# Patient Record
Sex: MALE | Race: WHITE | NOT HISPANIC OR LATINO | Employment: OTHER | URBAN - METROPOLITAN AREA
[De-identification: names, ages, dates, MRNs, and addresses within clinical notes are randomized per-mention and may not be internally consistent; named-entity substitution may affect disease eponyms.]

---
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
## 2021-11-18 ENCOUNTER — TRANSFERRED RECORDS (OUTPATIENT)
Dept: HEALTH INFORMATION MANAGEMENT | Facility: CLINIC | Age: 21
End: 2021-11-18

## 2021-11-19 ENCOUNTER — TRANSFERRED RECORDS (OUTPATIENT)
Dept: HEALTH INFORMATION MANAGEMENT | Facility: CLINIC | Age: 21
End: 2021-11-19
Payer: COMMERCIAL

## 2021-11-21 DIAGNOSIS — N21.0 BLADDER STONE: Primary | ICD-10-CM

## 2021-11-21 RX ORDER — AMPICILLIN 2 G/1
2 INJECTION, POWDER, FOR SOLUTION INTRAVENOUS
Status: CANCELLED | OUTPATIENT
Start: 2021-11-21

## 2021-11-24 ENCOUNTER — MEDICAL CORRESPONDENCE (OUTPATIENT)
Dept: HEALTH INFORMATION MANAGEMENT | Facility: CLINIC | Age: 21
End: 2021-11-24
Payer: COMMERCIAL

## 2021-12-03 ENCOUNTER — TELEPHONE (OUTPATIENT)
Dept: UROLOGY | Facility: CLINIC | Age: 21
End: 2021-12-03

## 2021-12-03 ENCOUNTER — HOSPITAL ENCOUNTER (OUTPATIENT)
Facility: AMBULATORY SURGERY CENTER | Age: 21
End: 2021-12-03
Attending: UROLOGY
Payer: MEDICAID

## 2021-12-03 DIAGNOSIS — N21.0 BLADDER STONE: ICD-10-CM

## 2021-12-03 NOTE — CONFIDENTIAL NOTE
Patient is scheduled for surgery with Dr. Max    Spoke with: Patient's mother Ayala    Date of Surgery: Monday 01/03/22     Location: [facility] OR     Informed patient they will need an adult  Yes    Pre op with Provider vijay    H&P: Scheduled with Patient will schedule with PCP    Pre-procedure COVID-19 Test: Patient will schedule closer to home. Instructed mother with COVID testing requirements for [facility].     Additional imaging/appointments: na    Surgery packet: mailed to patient 12/03/21, verified address on file is current and correct.      Additional comments: patient has Cerebral Palsy and is non verbal. Uses wheelchair. Requires transfer lift. Per mom patient does not have a cardiac or pulmonary history. Is not on dialysis and has not had any previous issues with anesthesia. Weighs 135 pounds.

## 2021-12-09 DIAGNOSIS — Z11.59 ENCOUNTER FOR SCREENING FOR OTHER VIRAL DISEASES: ICD-10-CM

## 2021-12-29 ENCOUNTER — PATIENT OUTREACH (OUTPATIENT)
Dept: UROLOGY | Facility: CLINIC | Age: 21
End: 2021-12-29
Payer: COMMERCIAL

## 2021-12-29 DIAGNOSIS — N21.0 BLADDER STONE: ICD-10-CM

## 2021-12-29 DIAGNOSIS — Z01.812 PRE-PROCEDURE LAB EXAM: Primary | ICD-10-CM

## 2021-12-29 NOTE — TELEPHONE ENCOUNTER
Informed mother of surgery date she was relieved however was at MD appointment and could not take information.  Mother to call writer in am for further information.  Stephanie Castillo RN

## 2021-12-30 ENCOUNTER — TELEPHONE (OUTPATIENT)
Dept: UROLOGY | Facility: CLINIC | Age: 21
End: 2021-12-30
Payer: COMMERCIAL

## 2021-12-30 NOTE — CONFIDENTIAL NOTE
Spoke to patients mother about surgery scheduled with Dr. Max date and location change. Patient's mother is aware that surgery has been moved from 01/03/22 at [facility] to Thursday 01/06/22 at [place] OR. Verified location address. She is also aware that patient needs to have a covid test on Monday 01/03/22 in accordance to [facility] Covid testing guidelines.

## 2022-01-03 RX ORDER — SULFAMETHOXAZOLE AND TRIMETHOPRIM 200; 40 MG/5ML; MG/5ML
20 SUSPENSION ORAL 2 TIMES DAILY
Qty: 120 ML | Refills: 0 | Status: SHIPPED | OUTPATIENT
Start: 2022-01-03 | End: 2022-01-06

## 2022-01-03 NOTE — TELEPHONE ENCOUNTER
Pre Op Teaching Flowsheet                                        Pre and Post op Patient Education  Relevant Diagnosis: kidney stone  Teaching Topic:  Pre and post op teaching for cystoscopy  Person Involved in teaching: mother        Motivation Level: High  Asks Questions: Yes  Eager to Learn:  Yes  Cooperative: Yes  Receptive (willing/able to accept information):  Yes  Patient demonstrates understanding of the following:  Date and time of surgery:    Location of surgery:   History and Physical and any other testing necessary prior to surgery Pre Op Physical with: PCP on completed  Required time line for completion of History and Physical and any pre-op testing: No more than 30 days prior to surgery date    NPO Guidelines: NPO per Anesthesia Guidelines : Reviewed (surgery packet for further information).    Patient demonstrates understanding of the following:  Patient understands the need for a responsible adult to drive them home and someone to stay with them for the first 24 hours post-operatively: mother  Pre-op bowel prep: N/A  Pre-op showering/scrub information with Hibiclens Soap: Yes  Medications to take the day of surgery: Pre op Physical for instruction.   Blood thinner medications discussed and when to stop (if applicable):  Yes  Diabetes medication management (if applicable):  Patient to discuss with Primary Care Provider  Discussed pain control after surgery: pain scale, pain medications and pain management techniques  Infection Prevention: Patient demonstrates understanding of the following:  Patient instructed on hand hygiene:  Yes  Surgical procedure site care taught: Yes  Signs and symptoms of infection taught:  Yes  Wound care will be taught at the time of discharge.    Urine anylisis and Urine Culture ordered on: 12/21/21  Pre op Covid testing on: 1/03/22  Post-op follow-up:As needed  Discussed how to contact the hospital, nurse, and clinic scheduling staff if necessary.     Surgical instructions  given to patient in clinic: via phone.   Instructional materials used/given/mailed: Before your surgery packet , Medications to avoid before surgery , Showering or Bathing instructions before surgery  and What to expect after surgery    Total time with patient: 10 minutes   Stephanie Castillo RN

## 2022-01-04 RX ORDER — DIAZEPAM 20 MG/4G
20 GEL RECTAL EVERY 10 MIN PRN
COMMUNITY

## 2022-01-04 RX ORDER — CLONAZEPAM 1 MG/1
1 TABLET ORAL
COMMUNITY

## 2022-01-04 RX ORDER — LAMOTRIGINE 200 MG/1
300 TABLET ORAL 2 TIMES DAILY
COMMUNITY

## 2022-01-04 RX ORDER — TRAZODONE HYDROCHLORIDE 50 MG/1
100 TABLET, FILM COATED ORAL AT BEDTIME
COMMUNITY

## 2022-01-04 RX ORDER — PANTOPRAZOLE SODIUM 20 MG/1
20 TABLET, DELAYED RELEASE ORAL DAILY
COMMUNITY

## 2022-01-04 RX ORDER — GLYCOPYRROLATE 1 MG/5ML
1 SOLUTION ORAL 4 TIMES DAILY
COMMUNITY

## 2022-01-04 RX ORDER — GLYCOPYRROLATE 0.2 MG/ML
0.1 INJECTION INTRAMUSCULAR; INTRAVENOUS ONCE
COMMUNITY
End: 2022-01-04

## 2022-01-04 RX ORDER — NYSTATIN 100000 U/G
OINTMENT TOPICAL 2 TIMES DAILY PRN
COMMUNITY

## 2022-01-04 RX ORDER — LACOSAMIDE 150 MG/1
TABLET ORAL 2 TIMES DAILY
COMMUNITY

## 2022-01-04 RX ORDER — LACTOBACILLUS RHAMNOSUS GG 10B CELL
2 CAPSULE ORAL 2 TIMES DAILY
COMMUNITY

## 2022-01-04 RX ORDER — PREGABALIN 200 MG/1
200 CAPSULE ORAL 3 TIMES DAILY
COMMUNITY

## 2022-01-04 RX ORDER — LANOLIN ALCOHOL/MO/W.PET/CERES
9 CREAM (GRAM) TOPICAL
COMMUNITY

## 2022-01-05 ENCOUNTER — ANESTHESIA EVENT (OUTPATIENT)
Dept: SURGERY | Facility: CLINIC | Age: 22
End: 2022-01-05
Payer: COMMERCIAL

## 2022-01-06 ENCOUNTER — HOSPITAL ENCOUNTER (OUTPATIENT)
Facility: CLINIC | Age: 22
Discharge: HOME | End: 2022-01-06
Attending: UROLOGY | Admitting: UROLOGY
Payer: COMMERCIAL

## 2022-01-06 ENCOUNTER — ANESTHESIA (OUTPATIENT)
Dept: SURGERY | Facility: CLINIC | Age: 22
End: 2022-01-06
Payer: COMMERCIAL

## 2022-01-06 VITALS
RESPIRATION RATE: 14 BRPM | HEART RATE: 87 BPM | DIASTOLIC BLOOD PRESSURE: 69 MMHG | OXYGEN SATURATION: 99 % | HEIGHT: 66 IN | SYSTOLIC BLOOD PRESSURE: 115 MMHG | TEMPERATURE: 98 F | BODY MASS INDEX: 21.65 KG/M2 | WEIGHT: 134.7 LBS

## 2022-01-06 LAB — GLUCOSE BLDC GLUCOMTR-MCNC: 87 MG/DL (ref 70–99)

## 2022-01-06 PROCEDURE — 250N000011 HC RX IP 250 OP 636: Performed by: UROLOGY

## 2022-01-06 PROCEDURE — 272N000001 HC OR GENERAL SUPPLY STERILE: Performed by: UROLOGY

## 2022-01-06 PROCEDURE — 250N000011 HC RX IP 250 OP 636: Performed by: NURSE ANESTHETIST, CERTIFIED REGISTERED

## 2022-01-06 PROCEDURE — 82962 GLUCOSE BLOOD TEST: CPT

## 2022-01-06 PROCEDURE — C1894 INTRO/SHEATH, NON-LASER: HCPCS | Performed by: UROLOGY

## 2022-01-06 PROCEDURE — 250N000025 HC SEVOFLURANE, PER MIN: Performed by: UROLOGY

## 2022-01-06 PROCEDURE — 258N000003 HC RX IP 258 OP 636: Performed by: UROLOGY

## 2022-01-06 PROCEDURE — 710N000011 HC RECOVERY PHASE 1, LEVEL 3, PER MIN: Performed by: UROLOGY

## 2022-01-06 PROCEDURE — 250N000009 HC RX 250: Performed by: NURSE ANESTHETIST, CERTIFIED REGISTERED

## 2022-01-06 PROCEDURE — 370N000017 HC ANESTHESIA TECHNICAL FEE, PER MIN: Performed by: UROLOGY

## 2022-01-06 PROCEDURE — 999N000141 HC STATISTIC PRE-PROCEDURE NURSING ASSESSMENT: Performed by: UROLOGY

## 2022-01-06 PROCEDURE — 258N000003 HC RX IP 258 OP 636: Performed by: NURSE ANESTHETIST, CERTIFIED REGISTERED

## 2022-01-06 PROCEDURE — 360N000075 HC SURGERY LEVEL 2, PER MIN: Performed by: UROLOGY

## 2022-01-06 PROCEDURE — 710N000012 HC RECOVERY PHASE 2, PER MINUTE: Performed by: UROLOGY

## 2022-01-06 PROCEDURE — 51705 CHANGE OF BLADDER TUBE: CPT | Mod: GC | Performed by: STUDENT IN AN ORGANIZED HEALTH CARE EDUCATION/TRAINING PROGRAM

## 2022-01-06 RX ORDER — FENTANYL CITRATE 50 UG/ML
25 INJECTION, SOLUTION INTRAMUSCULAR; INTRAVENOUS EVERY 5 MIN PRN
Status: DISCONTINUED | OUTPATIENT
Start: 2022-01-06 | End: 2022-01-06 | Stop reason: HOSPADM

## 2022-01-06 RX ORDER — LABETALOL HYDROCHLORIDE 5 MG/ML
10 INJECTION, SOLUTION INTRAVENOUS
Status: DISCONTINUED | OUTPATIENT
Start: 2022-01-06 | End: 2022-01-06 | Stop reason: HOSPADM

## 2022-01-06 RX ORDER — SODIUM CHLORIDE, SODIUM LACTATE, POTASSIUM CHLORIDE, CALCIUM CHLORIDE 600; 310; 30; 20 MG/100ML; MG/100ML; MG/100ML; MG/100ML
INJECTION, SOLUTION INTRAVENOUS CONTINUOUS PRN
Status: DISCONTINUED | OUTPATIENT
Start: 2022-01-06 | End: 2022-01-06

## 2022-01-06 RX ORDER — LIDOCAINE 40 MG/G
CREAM TOPICAL
Status: DISCONTINUED | OUTPATIENT
Start: 2022-01-06 | End: 2022-01-06 | Stop reason: HOSPADM

## 2022-01-06 RX ORDER — ONDANSETRON 2 MG/ML
INJECTION INTRAMUSCULAR; INTRAVENOUS PRN
Status: DISCONTINUED | OUTPATIENT
Start: 2022-01-06 | End: 2022-01-06

## 2022-01-06 RX ORDER — LIDOCAINE HYDROCHLORIDE 20 MG/ML
INJECTION, SOLUTION INFILTRATION; PERINEURAL PRN
Status: DISCONTINUED | OUTPATIENT
Start: 2022-01-06 | End: 2022-01-06

## 2022-01-06 RX ORDER — PROPOFOL 10 MG/ML
INJECTION, EMULSION INTRAVENOUS PRN
Status: DISCONTINUED | OUTPATIENT
Start: 2022-01-06 | End: 2022-01-06

## 2022-01-06 RX ORDER — SODIUM CHLORIDE, SODIUM LACTATE, POTASSIUM CHLORIDE, CALCIUM CHLORIDE 600; 310; 30; 20 MG/100ML; MG/100ML; MG/100ML; MG/100ML
INJECTION, SOLUTION INTRAVENOUS CONTINUOUS
Status: DISCONTINUED | OUTPATIENT
Start: 2022-01-06 | End: 2022-01-06 | Stop reason: HOSPADM

## 2022-01-06 RX ORDER — ONDANSETRON 2 MG/ML
4 INJECTION INTRAMUSCULAR; INTRAVENOUS EVERY 30 MIN PRN
Status: DISCONTINUED | OUTPATIENT
Start: 2022-01-06 | End: 2022-01-06 | Stop reason: HOSPADM

## 2022-01-06 RX ORDER — ONDANSETRON 4 MG/1
4 TABLET, ORALLY DISINTEGRATING ORAL EVERY 30 MIN PRN
Status: DISCONTINUED | OUTPATIENT
Start: 2022-01-06 | End: 2022-01-06 | Stop reason: HOSPADM

## 2022-01-06 RX ORDER — AMPICILLIN 2 G/1
2 INJECTION, POWDER, FOR SOLUTION INTRAVENOUS
Status: COMPLETED | OUTPATIENT
Start: 2022-01-06 | End: 2022-01-06

## 2022-01-06 RX ADMIN — GENTAMICIN SULFATE 180 MG: 40 INJECTION, SOLUTION INTRAMUSCULAR; INTRAVENOUS at 08:41

## 2022-01-06 RX ADMIN — SUGAMMADEX 150 MG: 100 INJECTION, SOLUTION INTRAVENOUS at 09:55

## 2022-01-06 RX ADMIN — MIDAZOLAM 1 MG: 1 INJECTION INTRAMUSCULAR; INTRAVENOUS at 09:25

## 2022-01-06 RX ADMIN — ONDANSETRON 4 MG: 2 INJECTION INTRAMUSCULAR; INTRAVENOUS at 09:56

## 2022-01-06 RX ADMIN — PROPOFOL 50 MG: 10 INJECTION, EMULSION INTRAVENOUS at 09:31

## 2022-01-06 RX ADMIN — SODIUM CHLORIDE, POTASSIUM CHLORIDE, SODIUM LACTATE AND CALCIUM CHLORIDE: 600; 310; 30; 20 INJECTION, SOLUTION INTRAVENOUS at 09:19

## 2022-01-06 RX ADMIN — AMPICILLIN SODIUM 2 G: 2 INJECTION, POWDER, FOR SOLUTION INTRAMUSCULAR; INTRAVENOUS at 09:40

## 2022-01-06 RX ADMIN — LIDOCAINE HYDROCHLORIDE 60 MG: 20 INJECTION, SOLUTION INFILTRATION; PERINEURAL at 09:32

## 2022-01-06 RX ADMIN — ROCURONIUM BROMIDE 30 MG: 50 INJECTION, SOLUTION INTRAVENOUS at 09:32

## 2022-01-06 ASSESSMENT — MIFFLIN-ST. JEOR: SCORE: 1558.75

## 2022-01-06 ASSESSMENT — ENCOUNTER SYMPTOMS: SEIZURES: 1

## 2022-01-06 NOTE — DISCHARGE INSTRUCTIONS
[facility] [place] // Same-Day Surgery // Adult Discharge Orders & Instructions     Take it easy when you get home.  Remember, same day surgery DOES NOT MEAN SAME DAY RECOVERY! Healing is a gradual process.   You will need some time to recover- you may be more tired than you realize at first.  Rest and relax for at least the first 24 hours at home.  You'll feel better and heal faster if you take good care of yourself.     ANESTHESIA RECOVERY -   For 24 hours after surgery    1. Get plenty of rest.  A responsible adult must stay with you for at least 24 hours after you leave the hospital.   2. Do not drive or use heavy equipment.  If you have weakness or tingling, don't drive or use heavy equipment until this feeling goes away.  3. Do not drink alcohol.  4. Avoid strenuous or risky activities.  Ask for help when climbing stairs.   5. You may feel lightheaded.  IF so, sit for a few minutes before standing.  Have someone help you get up.   6. If you have nausea (feel sick to your stomach): Drink only clear liquids such as apple juice, ginger ale, broth or 7-Up.  Rest may also help.  Be sure to drink enough fluids.  Move to a regular diet as you feel able.  7. You may have a slight fever. Call the doctor if your fever is over 100 F (37.7 C) (taken under the tongue) or lasts longer than 24 hours.  8. You may have a dry mouth, a sore throat, muscle aches or trouble sleeping.  These should go away after 24 hours.  9. Do not make important or legal decisions.     Call your doctor for any of the followin.  Signs of infection (fever, growing tenderness at the surgery site, a large amount of drainage or bleeding, severe pain, foul-smelling drainage, redness, swelling).  2. It has been over 8 to 10 hours since surgery and you are still not able to urinate (pass water).  3.  Headache for over 24 hours.    To contact a doctor, call:    Dr Max's clinic at [phone] and  ask for the resident on call for Urology (answered 24 hours a day)    Emergency Department: [facility]: [phone] (TTY for hearing impaired: [phone])

## 2022-01-06 NOTE — OR NURSING
Discharge instructions provided and reviewed with Ayala (mother, caregiver), designated caregiver. Printed after visit summary sent home with patient. Understanding verbalized.  No new scripts today. Yip capped at discharge which is Ayala's preference over yip bag connected, this plan was communicated w Dr Winter michael.

## 2022-01-06 NOTE — ANESTHESIA POSTPROCEDURE EVALUATION
Patient: Erasto Stinson    Procedure: Procedure(s):  CYSTOSCOPY, with laser standby       Diagnosis:Bladder stone [N21.0]  Diagnosis Additional Information: No value filed.    Anesthesia Type:  General    Note:  Disposition: Outpatient   Postop Pain Control: Uneventful            Sign Out: Well controlled pain   PONV: No   Neuro/Psych: Uneventful            Sign Out: Acceptable/Baseline neuro status   Airway/Respiratory: Uneventful            Sign Out: Acceptable/Baseline resp. status   CV/Hemodynamics: Uneventful            Sign Out: Acceptable CV status; No obvious hypovolemia; No obvious fluid overload   Other NRE: NONE   DID A NON-ROUTINE EVENT OCCUR? No           Last vitals:  Vitals Value Taken Time   /67 01/06/22 1045   Temp 36.7  C (98  F) 01/06/22 1045   Pulse 82 01/06/22 1053   Resp 12 01/06/22 1045   SpO2 99 % 01/06/22 1053   Vitals shown include unvalidated device data.    Electronically Signed By: Dejan Tovar DO  January 6, 2022  11:00 AM

## 2022-01-06 NOTE — ANESTHESIA CARE TRANSFER NOTE
Patient: Erasto Stinson    Procedure: Procedure(s):  CYSTOSCOPY, with laser standby       Diagnosis: Bladder stone [N21.0]  Diagnosis Additional Information: No value filed.    Anesthesia Type:   General     Note:    Oropharynx: oropharynx clear of all foreign objects  Level of Consciousness: awake  Oxygen Supplementation: face mask  Level of Supplemental Oxygen (L/min / FiO2): 8  Independent Airway: airway patency satisfactory and stable  Dentition: dentition unchanged  Vital Signs Stable: post-procedure vital signs reviewed and stable  Report to RN Given: handoff report given  Patient transferred to: PACU    Handoff Report: Identifed the Patient, Identified the Reponsible Provider, Reviewed the pertinent medical history, Discussed the surgical course, Reviewed Intra-OP anesthesia mangement and issues during anesthesia, Set expectations for post-procedure period and Allowed opportunity for questions and acknowledgement of understanding      Vitals:  Vitals Value Taken Time   /85 01/06/22 1014   Temp 36.5 01/06/22 1020   Pulse 80 01/06/22 1020   Resp 12 01/06/22 1020   SpO2 100 % 01/06/22 1020   Vitals shown include unvalidated device data.    Electronically Signed By: EDWINA Chacko CRNA  January 6, 2022  10:20 AM

## 2022-01-06 NOTE — OP NOTE
OPERATIVE REPORT  1/6/21    PRE-OPERATIVE DIAGNOSIS:   1. Bladder stone    POST-OPERATIVE DIAGNOSIS:   1.  Negative cystoscopy    PROCEDURES PERFORMED:   1. Cystourethroscopy  2. Exchange of SPT    STAFF SURGEON: Logan Max MD  RESIDENT SURGEON:  Angelika Bonilla MD    ANESTHESIA: GETA  ESTIMATED BLOOD LOSS: 0 mL.   SPECIMEN: none  DRAINS/TUBES: 20Fr SPT    SIGNIFICANT FINDINGS:  1. No bladder stone. Ultrasound likely consistent with calcified debris, which was previously irrigated.    OPERATIVE INDICATIONS:   Erasto Stinson is a(n) 21 year old male with a history of CP and recurrent UTIs found to have ultrasound consistent with a 1.8cm bladder stone at [place]. The patient was counseled on the alternatives, risks, and benefits and elected to proceed with the above stated procedure.    DESCRIPTION OF PROCEDURE:   After verification of informed consent was obtained, the patient was brought to the operating room, and moved to the operating table. After adequate anesthesia was induced, the patient was repositioned in the lithotomy position and prepped and draped in the usual sterile fashion. A formal timeout was performed to confirm the correct patient, procedure and operative site.     A 22Fr rigid cystoscope was inserted into a well lubricated urethra. The urethra was unremarkable. Media was clear. Inside the bladder there was minimal debris and no clear stone. The cystoscope was withdrawn. The sheath was exchanged for 19Fr and the cystoscope was inserted into the patient's SP tract. Again, there was no stone visualized. The bladder neck was closed. The cystoscope was removed and a 20fr catheter was placed into the SP tract. This concluded our procedure.     The procedure was then concluded. The patient was transferred to the postanesthesia care unit in stable condition and tolerated the procedure well.    As attending surgeon, I, Logan Max MD, was present for the entire procedure.

## 2022-01-06 NOTE — ANESTHESIA PROCEDURE NOTES
Airway       Patient location during procedure: OR       Procedure Start/Stop Times: 1/6/2022 9:35 AM  Staff -        Anesthesiologist:  Niki Ratliff MD       Other Anesthesia Staff: Kristina Langford       Performed By: SRNA  Consent for Airway        Urgency: elective  Indications and Patient Condition       Indications for airway management: lavelle-procedural       Induction type:intravenous       Mask difficulty assessment: 1 - vent by mask    Final Airway Details       Final airway type: endotracheal airway       Successful airway: ETT - single  Endotracheal Airway Details        ETT size (mm): 7.0       Cuffed: yes       Successful intubation technique: direct laryngoscopy and video laryngoscopy       VL Blade Size: MAC 3       Grade View of Cords: 1       Adjucts: stylet       Position: Right       Measured from: gums/teeth       Secured at (cm): 22       Bite block used: None    Post intubation assessment        Placement verified by: capnometry, equal breath sounds and chest rise        Number of attempts at approach: 1       Number of other approaches attempted: 0       Secured with: pink tape       Ease of procedure: easy       Dentition: Unchanged

## 2022-01-06 NOTE — ANESTHESIA PREPROCEDURE EVALUATION
Anesthesia Pre-Procedure Evaluation    Patient: Erasto Stinson   MRN: [id number] : 2000        Preoperative Diagnosis: Bladder stone [N21.0]    Procedure : Procedure(s):  CYSTOSCOPY, WITH CALCULUS REMOVAL WITH POSSIBLE Holmium LASER  Latex Free          Past Medical History:   Diagnosis Date     Agenesis of corpus callosum (H)      Cerebral palsy (H)      Congenital encephalopathy (H)      Congenital quadriplegia (H)      Epilepsy (H)      Head injury      Movement disorder      Pachygyria (H)     parietal, posterior, temporal, occipital lobes     Sleep apnea     no CPAP, elevate HOB while sleeping     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, delivered      Vision abnormalities       Past Surgical History:   Procedure Laterality Date     BACK SURGERY      cervical     CYSTOSCOPY       EYE SURGERY       FEMUR FRACTURE SURGERY       GASTRIC FUNDOPLICATION       HIGH TIBIAL OSTEOTOMY       OPEN TENOTOMY HIP ADDUCTOR       SUPRAPUBIC CATHETER INSERTION       TENDON RELEASE       UPPER GI ENDOSCOPY       XR PERCUTANEOUS GASTROSTOMY TUBE PLACEMENT        Allergies   Allergen Reactions     Morphine      Stopped breathing (not sure how much was given)     Penicillins Rash      Social History     Tobacco Use     Smoking status: Never Smoker     Smokeless tobacco: Never Used   Substance Use Topics     Alcohol use: Never      Wt Readings from Last 1 Encounters:   22 61.1 kg (134 lb 11.2 oz)        Anesthesia Evaluation   Pt has had prior anesthetic. Type: General.    No history of anesthetic complications       ROS/MED HX  ENT/Pulmonary:     (+) sleep apnea,     Neurologic: Comment: Quadriplegia      (+) seizures (a few days ago), features: generalized tonic clonic, Developmental delay, level of function: non-verbal,     Cardiovascular:  - neg cardiovascular ROS     METS/Exercise Tolerance:     Hematologic:       Musculoskeletal:       GI/Hepatic:       Renal/Genitourinary:     (+) Nephrolithiasis ,     Endo:        Psychiatric/Substance Use:       Infectious Disease:       Malignancy:       Other:            Physical Exam    Airway      Comment: Unable to assess airway due to patient cooperation    Normal appearing TM distance         Respiratory Devices and Support         Dental           Cardiovascular             Pulmonary                   OUTSIDE LABS:  CBC:   Lab Results   Component Value Date    WBC 33.2 (HH) 07/11/2007    HGB 14.4 (H) 07/11/2007    HCT 42.6 07/11/2007     (H) 07/11/2007     BMP:   Lab Results   Component Value Date     07/11/2007    POTASSIUM 4.4 07/11/2007    CHLORIDE 103 07/11/2007    CO2 29 07/11/2007    BUN 16 07/11/2007    CR 0.65 07/11/2007    GLC 87 01/06/2022     (H) 07/11/2007     COAGS:   Lab Results   Component Value Date    PTT 30 07/11/2007    INR 1.17 (H) 07/11/2007     POC:   Lab Results   Component Value Date     (H) 07/11/2007     HEPATIC: No results found for: ALBUMIN, PROTTOTAL, ALT, AST, GGT, ALKPHOS, BILITOTAL, BILIDIRECT, MARIBELL  OTHER:   Lab Results   Component Value Date    NAIMA 8.9 07/11/2007       Anesthesia Plan    ASA Status:  3   NPO Status:  NPO Appropriate    Anesthesia Type: General.     - Airway: ETT   Induction: Intravenous.   Maintenance: Balanced.        Consents    Anesthesia Plan(s) and associated risks, benefits, and realistic alternatives discussed. Questions answered and patient/representative(s) expressed understanding.    - Discussed:     - Discussed with:  Parent (Mother and/or Father)         Postoperative Care    Pain management: IV analgesics.   PONV prophylaxis: Ondansetron (or other 5HT-3)     Comments:                Niki Ratliff MD

## 2022-01-18 ENCOUNTER — PRE VISIT (OUTPATIENT)
Dept: UROLOGY | Facility: CLINIC | Age: 22
End: 2022-01-18
Payer: COMMERCIAL

## 2022-01-24 ENCOUNTER — PRE VISIT (OUTPATIENT)
Dept: UROLOGY | Facility: CLINIC | Age: 22
End: 2022-01-24
Payer: COMMERCIAL

## 2022-02-09 ENCOUNTER — OFFICE VISIT (OUTPATIENT)
Dept: UROLOGY | Facility: CLINIC | Age: 22
End: 2022-02-09
Payer: MEDICAID

## 2022-02-09 VITALS
WEIGHT: 132 LBS | SYSTOLIC BLOOD PRESSURE: 119 MMHG | DIASTOLIC BLOOD PRESSURE: 82 MMHG | BODY MASS INDEX: 22.53 KG/M2 | HEIGHT: 64 IN | HEART RATE: 105 BPM

## 2022-02-09 DIAGNOSIS — N31.9 NEUROGENIC BLADDER: ICD-10-CM

## 2022-02-09 DIAGNOSIS — N21.0 BLADDER STONE: Primary | ICD-10-CM

## 2022-02-09 LAB
ALBUMIN UR-MCNC: 100 MG/DL
APPEARANCE UR: CLEAR
BILIRUB UR QL STRIP: NEGATIVE
COLOR UR AUTO: YELLOW
GLUCOSE UR STRIP-MCNC: NEGATIVE MG/DL
HGB UR QL STRIP: ABNORMAL
KETONES UR STRIP-MCNC: NEGATIVE MG/DL
LEUKOCYTE ESTERASE UR QL STRIP: ABNORMAL
NITRATE UR QL: NEGATIVE
PH UR STRIP: 8.5 [PH] (ref 5–8)
SP GR UR STRIP: 1.02 (ref 1–1.03)
UROBILINOGEN UR STRIP-ACNC: 0.2 E.U./DL

## 2022-02-09 PROCEDURE — 99213 OFFICE O/P EST LOW 20 MIN: CPT | Mod: 25 | Performed by: UROLOGY

## 2022-02-09 PROCEDURE — 51705 CHANGE OF BLADDER TUBE: CPT | Performed by: UROLOGY

## 2022-02-09 RX ORDER — AMITRIPTYLINE HYDROCHLORIDE 100 MG/1
TABLET ORAL
COMMUNITY
Start: 2022-02-04

## 2022-02-09 RX ORDER — CIPROFLOXACIN 500 MG/1
500 TABLET, FILM COATED ORAL ONCE
Status: ACTIVE | OUTPATIENT
Start: 2022-02-09

## 2022-02-09 ASSESSMENT — MIFFLIN-ST. JEOR: SCORE: 1514.75

## 2022-02-09 NOTE — PATIENT INSTRUCTIONS
Please follow up in [place].     It was a pleasure meeting with you today.  Thank you for allowing me and my team the privilege of caring for you today.  YOU are the reason we are here, and I truly hope we provided you with the excellent service you deserve.  Please let us know if there is anything else we can do for you so that we can be sure you are leaving completely satisfied with your care experience.

## 2022-02-09 NOTE — NURSING NOTE
"Chief Complaint   Patient presents with     Follow Up     testicular pain       Blood pressure 119/82, pulse 105, height 1.626 m (5' 4\"), weight 59.9 kg (132 lb). Body mass index is 22.66 kg/m .    Patient Active Problem List   Diagnosis     Bladder stone       Allergies   Allergen Reactions     Morphine      Stopped breathing (not sure how much was given)     Penicillins Rash and Hives       Current Outpatient Medications   Medication Sig Dispense Refill     amitriptyline (ELAVIL) 100 MG tablet        amitriptyline HCl POWD 7 ml (7mg) at HS per GTube       cholecalciferol (VITAMIN D3) 25 mcg (1000 units) capsule Take 1 capsule by mouth daily       clonazePAM (KLONOPIN) 1 MG tablet Take 1 mg by mouth nightly as needed for sleep        diazepam (DIASTAT) 20 MG GEL rectal gel Place 20 mg rectally every 10 minutes as needed for seizures       docusate sodium (ENEMEEZ) 283 MG enema Place 1 enema rectally daily       glycopyrrolate (CUVPOSA) 1 MG/5ML solution Take 1 mg by mouth 4 times daily       lacosamide (VIMPAT) 150 MG TABS tablet Take by mouth 2 times daily       lactobacillus rhamnosus, GG, (CULTURELL) capsule Take 2 capsules by mouth 2 times daily       lamoTRIgine (LAMICTAL) 200 MG tablet Take 300 mg by mouth 2 times daily        magnesium hydroxide (MILK OF MAGNESIA) 400 MG/5ML suspension Take by mouth At Bedtime        melatonin 3 MG tablet Take 9 mg by mouth nightly as needed for sleep       miconazole (MICATIN) 2 % AERP powder Apply topically 2 times daily       nystatin (MYCOSTATIN) 123768 UNIT/GM external ointment Apply topically 2 times daily as needed        oxybutynin (DITROPAN) 5 MG/5ML syrup Take 10 mg by mouth 3 times daily        pantoprazole (PROTONIX) 20 MG EC tablet Take 20 mg by mouth daily        Pregabalin (LYRICA) 200 MG capsule Take 200 mg by mouth 3 times daily       traZODone (DESYREL) 50 MG tablet Take 100 mg by mouth At Bedtime Per GTube         Social History     Tobacco Use     Smoking " status: Never Smoker     Smokeless tobacco: Never Used   Substance Use Topics     Alcohol use: Never     Drug use: Never       Alban Alvarez EMT  2/9/2022  9:35 AM

## 2022-02-09 NOTE — PROGRESS NOTES
Erasto Stinson is a 21 year old male   Has CP, recurrent UTIs.  Lives with indwelling SPT.  Cap/uncap every 2 hours.  Parents irrigate daily with 180cc.  They do vinegar instillation as well.  We took him to OR 1/6/22 for bladder stone removal, but this was not present. Bladder stone was likely debris previously irrigated out.    They used to use ADRIANE key button      Vital signs reviewed    Exam:  SPT draining clear.  It was removed and replaced with new 20 Fr catheter today. 10cc in the balloon.    A/P   Neurogenic bladder.  Suprapubic tube (20Fr) was exchanged today  We discussed methods to reduce UTI risks.  We discussed that we went to OR and bladder appeared clean without significant mucous. No stone was identified.  Followup at [place] for routine care.    Logan Max MD    In addition to suprapubic tube exchange:  30 minutes spent on the date of the encounter doing chart review, history and exam, documentation and further activities per the note

## 2022-02-09 NOTE — LETTER
2/9/2022       RE: Erasto Stinson  [address] [address]     Dear Colleague,    Thank you for referring your patient, Erasto Stinson, to the [facility] [place] [facility]. Please see a copy of my visit note below.    Erasto Stinson is a 21 year old male   Has CP, recurrent UTIs.  Lives with indwelling SPT.  Cap/uncap every 2 hours.  Parents irrigate daily with 180cc.  They do vinegar instillation as well.  We took him to OR 1/6/22 for bladder stone removal, but this was not present. Bladder stone was likely debris previously irrigated out.    They used to use ADRIANE key button      Vital signs reviewed    Exam:  SPT draining clear.  It was removed and replaced with new 20 Fr catheter today. 10cc in the balloon.    A/P   Neurogenic bladder.  Suprapubic tube (20Fr) was exchanged today  We discussed methods to reduce UTI risks.  We discussed that we went to OR and bladder appeared clean without significant mucous. No stone was identified.  Followup at [place] for routine care.    Logan Max MD    In addition to suprapubic tube exchange:  30 minutes spent on the date of the encounter doing chart review, history and exam, documentation and further activities per the note

## 2024-02-09 NOTE — TELEPHONE ENCOUNTER
Reason for visit: Follow up     Relevant information: US results    Records/imaging/labs/orders: in Epic; US completed    Pt called: no    At [place]: normal    
good, to achieve stated therapy goals

## (undated) DEVICE — ESU GROUND PAD ADULT W/CORD E7507

## (undated) DEVICE — SOL NACL 0.9% IRRIG 3000ML BAG 2B7477

## (undated) DEVICE — DRSG GAUZE 4X4" TRAY 6939

## (undated) DEVICE — JELLY LUBRICATING SURGILUBE 2OZ TUBE

## (undated) DEVICE — SHEATH URETERAL 12/14FRX35CM SET

## (undated) DEVICE — BAG URINARY DRAIN LUBRISIL IC 4000ML LF 253509A

## (undated) DEVICE — SUCTION MANIFOLD NEPTUNE 2 SYS 4 PORT 0702-020-000

## (undated) DEVICE — LINEN TOWEL PACK X5 5464

## (undated) DEVICE — SPECIMEN CONTAINER 5OZ STERILE 2600SA

## (undated) DEVICE — PUMP SYSTEM SINGLE ACTION M0067201000

## (undated) DEVICE — PACK CYSTO UMMC CUSTOM

## (undated) DEVICE — GLOVE PROTEXIS W/NEU-THERA 7.5  2D73TE75

## (undated) DEVICE — CATH FOLYSIL 20FR 15ML AA6120

## (undated) DEVICE — PAD CHUX UNDERPAD 23X24" 7136

## (undated) RX ORDER — AMPICILLIN 2 G/1
INJECTION, POWDER, FOR SOLUTION INTRAVENOUS
Status: DISPENSED
Start: 2022-01-06

## (undated) RX ORDER — DEXAMETHASONE SODIUM PHOSPHATE 4 MG/ML
INJECTION, SOLUTION INTRA-ARTICULAR; INTRALESIONAL; INTRAMUSCULAR; INTRAVENOUS; SOFT TISSUE
Status: DISPENSED
Start: 2022-01-06

## (undated) RX ORDER — PROPOFOL 10 MG/ML
INJECTION, EMULSION INTRAVENOUS
Status: DISPENSED
Start: 2022-01-06

## (undated) RX ORDER — ONDANSETRON 2 MG/ML
INJECTION INTRAMUSCULAR; INTRAVENOUS
Status: DISPENSED
Start: 2022-01-06

## (undated) RX ORDER — CIPROFLOXACIN 500 MG/1
TABLET, FILM COATED ORAL
Status: DISPENSED
Start: 2022-02-09

## (undated) RX ORDER — LIDOCAINE HYDROCHLORIDE 20 MG/ML
INJECTION, SOLUTION EPIDURAL; INFILTRATION; INTRACAUDAL; PERINEURAL
Status: DISPENSED
Start: 2022-01-06

## (undated) RX ORDER — FENTANYL CITRATE 50 UG/ML
INJECTION, SOLUTION INTRAMUSCULAR; INTRAVENOUS
Status: DISPENSED
Start: 2022-01-06